# Patient Record
Sex: FEMALE | Race: BLACK OR AFRICAN AMERICAN | NOT HISPANIC OR LATINO | URBAN - METROPOLITAN AREA
[De-identification: names, ages, dates, MRNs, and addresses within clinical notes are randomized per-mention and may not be internally consistent; named-entity substitution may affect disease eponyms.]

---

## 2024-05-20 ENCOUNTER — OFFICE VISIT (OUTPATIENT)
Dept: RADIATION ONCOLOGY | Facility: CLINIC | Age: 84
End: 2024-05-20
Payer: COMMERCIAL

## 2024-05-20 VITALS
RESPIRATION RATE: 18 BRPM | BODY MASS INDEX: 29.17 KG/M2 | WEIGHT: 175.06 LBS | HEIGHT: 65 IN | HEART RATE: 71 BPM | SYSTOLIC BLOOD PRESSURE: 147 MMHG | TEMPERATURE: 97 F | OXYGEN SATURATION: 96 % | DIASTOLIC BLOOD PRESSURE: 68 MMHG

## 2024-05-20 DIAGNOSIS — C77.9 PRIMARY CANCER OF LEFT BREAST WITH STAGE 2 NODAL METASTASIS PER AMERICAN JOINT COMMITTEE ON CANCER 7TH EDITION (N2): Primary | ICD-10-CM

## 2024-05-20 DIAGNOSIS — C50.912 PRIMARY CANCER OF LEFT BREAST WITH STAGE 2 NODAL METASTASIS PER AMERICAN JOINT COMMITTEE ON CANCER 7TH EDITION (N2): Primary | ICD-10-CM

## 2024-05-20 PROCEDURE — 3077F SYST BP >= 140 MM HG: CPT | Mod: CPTII,S$GLB,, | Performed by: SPECIALIST

## 2024-05-20 PROCEDURE — 3288F FALL RISK ASSESSMENT DOCD: CPT | Mod: CPTII,S$GLB,, | Performed by: SPECIALIST

## 2024-05-20 PROCEDURE — 1101F PT FALLS ASSESS-DOCD LE1/YR: CPT | Mod: CPTII,S$GLB,, | Performed by: SPECIALIST

## 2024-05-20 PROCEDURE — 1159F MED LIST DOCD IN RCRD: CPT | Mod: CPTII,S$GLB,, | Performed by: SPECIALIST

## 2024-05-20 PROCEDURE — 1126F AMNT PAIN NOTED NONE PRSNT: CPT | Mod: CPTII,S$GLB,, | Performed by: SPECIALIST

## 2024-05-20 PROCEDURE — 99213 OFFICE O/P EST LOW 20 MIN: CPT | Mod: S$GLB,,, | Performed by: SPECIALIST

## 2024-05-20 PROCEDURE — 3078F DIAST BP <80 MM HG: CPT | Mod: CPTII,S$GLB,, | Performed by: SPECIALIST

## 2024-05-20 PROCEDURE — 99999 PR PBB SHADOW E&M-NEW PATIENT-LVL V: CPT | Mod: PBBFAC,,, | Performed by: SPECIALIST

## 2024-05-20 RX ORDER — LATANOPROST 50 UG/ML
1 SOLUTION/ DROPS OPHTHALMIC NIGHTLY
COMMUNITY
Start: 2024-04-23

## 2024-05-20 RX ORDER — NAPROXEN SODIUM 220 MG/1
81 TABLET, FILM COATED ORAL
COMMUNITY

## 2024-05-20 RX ORDER — BRIMONIDINE TARTRATE 2 MG/ML
1 SOLUTION/ DROPS OPHTHALMIC 2 TIMES DAILY
COMMUNITY
Start: 2024-04-23

## 2024-05-20 RX ORDER — TIMOLOL MALEATE 5 MG/ML
1 SOLUTION/ DROPS OPHTHALMIC 2 TIMES DAILY
COMMUNITY

## 2024-05-20 RX ORDER — ACETAZOLAMIDE 250 MG/1
500 TABLET ORAL 2 TIMES DAILY
COMMUNITY
Start: 2024-04-30

## 2024-05-20 RX ORDER — DEXTROMETHORPHAN HYDROBROMIDE, GUAIFENESIN 5; 100 MG/5ML; MG/5ML
650 LIQUID ORAL EVERY 8 HOURS PRN
COMMUNITY

## 2024-05-20 RX ORDER — MIRABEGRON 25 MG/1
25 TABLET, FILM COATED, EXTENDED RELEASE ORAL
COMMUNITY

## 2024-05-20 RX ORDER — MIRTAZAPINE 7.5 MG/1
1 TABLET, FILM COATED ORAL NIGHTLY
COMMUNITY
Start: 2024-05-01

## 2024-05-20 RX ORDER — AMOXICILLIN AND CLAVULANATE POTASSIUM 875; 125 MG/1; MG/1
1 TABLET, FILM COATED ORAL
COMMUNITY

## 2024-05-20 RX ORDER — MULTIVIT-MIN/IRON/FOLIC ACID/K 18-600-40
1 CAPSULE ORAL
COMMUNITY

## 2024-05-20 RX ORDER — NITROFURANTOIN 25; 75 MG/1; MG/1
CAPSULE ORAL
COMMUNITY
Start: 2023-09-07

## 2024-05-20 RX ORDER — DILTIAZEM HYDROCHLORIDE 240 MG/1
240 CAPSULE, COATED, EXTENDED RELEASE ORAL 2 TIMES DAILY
COMMUNITY
Start: 2024-03-17

## 2024-05-20 RX ORDER — OMEPRAZOLE 20 MG/1
20 CAPSULE, DELAYED RELEASE ORAL 2 TIMES DAILY PRN
COMMUNITY

## 2024-05-20 RX ORDER — ATORVASTATIN CALCIUM 20 MG/1
20 TABLET, FILM COATED ORAL
COMMUNITY

## 2024-05-20 RX ORDER — CLORAZEPATE DIPOTASSIUM 3.75 MG/1
3.75 TABLET ORAL
COMMUNITY
Start: 2024-02-08

## 2024-05-20 RX ORDER — BRINZOLAMIDE 10 MG/ML
SUSPENSION/ DROPS OPHTHALMIC
COMMUNITY
Start: 2024-05-16

## 2024-05-20 RX ORDER — CETIRIZINE HYDROCHLORIDE 10 MG/1
TABLET ORAL
COMMUNITY
Start: 2023-09-07

## 2024-05-20 RX ORDER — ASCORBIC ACID 125 MG
1 TABLET,CHEWABLE ORAL
COMMUNITY

## 2024-05-20 RX ORDER — PNV NO.95/FERROUS FUM/FOLIC AC 28MG-0.8MG
100 TABLET ORAL
COMMUNITY

## 2024-05-20 NOTE — PROGRESS NOTES
Mrs. Perkins returns for follow-up after undergoing neoadjuvant chemotherapy, bilateral mastectomy and left chest wall comprehensive radiotherapy.  She has done well in the interval since last seen although she continues to have significant lymphedema of her left arm.  She does have a pump at home but has not been using it.  She continues follow-up with Dr. Murphy.  Physical examination:  She has no evidence of disease recurrence on her chest wall.  She has no palpable axillary or supraclavicular adenopathy.  She has fairly good range of motion of her left shoulder.  She has significant lymphedema of her left arm.  Impression: We have had a fairly extensive discussion regarding use of her home lymphedema pump which she has not been using.  I have recommended that she use it twice daily for a week and then try daily and so on until she finds the right interval for her own comfort.  Plan: I will see her back in 6 months.  I certainly appreciate participating in this delightful woman's care.

## 2025-02-12 ENCOUNTER — TELEPHONE (OUTPATIENT)
Dept: HEMATOLOGY/ONCOLOGY | Facility: CLINIC | Age: 85
End: 2025-02-12
Payer: COMMERCIAL

## 2025-02-12 NOTE — TELEPHONE ENCOUNTER
KARLOS consulted to assist with transportation needs. KARLOS spoke with pt, and pt requests to be p/u from dt's home at [address] [id number]. KARLOS explained Lyft ride process to pt. SW added son Aj Perkins to Lyft ride notification, his # is [phone].  Pt also asked that SW add son and sister Catalina Alva [phone] to emergency contacts. Pt will need to call KARLOS dept at [phone] or [phone] for return ride home or Rad staff can enter as well. No other needs expressed. SW will remain available.     scheduled on Feb 19, 09:30 AM CST   will arrive around 09:30 AM CST.  Edit ride  Schedule return  Cancel ride  Nikolay  Full name  ANGELA PERKINS  Mobile phone number  [phone]  Ride type  Ly[employer] Standard (4 seats)  Additional mobile number  [phone]  Route    Pickup  [address], [place], [facility]  Drop-off  [facility]  Addresses may vary from the original request due to minor pickup and drop-off changes. For example, an address may vary if a rider is picked up across the street.      Note to   No note added  Tip added  $0.00  Request details    Coordinator name  Grace Saldivar  Date and time  2/12/25, 09:58 AM CST  Scheduled ride ID  [id number]  Internal note  No note added

## 2025-02-14 ENCOUNTER — TELEPHONE (OUTPATIENT)
Dept: RADIATION ONCOLOGY | Facility: CLINIC | Age: 85
End: 2025-02-14
Payer: COMMERCIAL

## 2025-02-14 NOTE — TELEPHONE ENCOUNTER
Attempted to call patient to reschedule Dr Ace's f/u appt on 2/19/25. There was no answer & unable to leave a message.

## 2025-02-26 ENCOUNTER — TELEPHONE (OUTPATIENT)
Dept: HEMATOLOGY/ONCOLOGY | Facility: CLINIC | Age: 85
End: 2025-02-26
Payer: COMMERCIAL

## 2025-02-26 NOTE — TELEPHONE ENCOUNTER
SW attempted to reach pt re: transportation request for rescheduled appt with Dr. Ace on 4/3/25. No answer or vm option. SW will attempt to reach pt again next week.

## 2025-03-05 ENCOUNTER — TELEPHONE (OUTPATIENT)
Dept: HEMATOLOGY/ONCOLOGY | Facility: CLINIC | Age: 85
End: 2025-03-05
Payer: COMMERCIAL

## 2025-03-10 ENCOUNTER — TELEPHONE (OUTPATIENT)
Dept: HEMATOLOGY/ONCOLOGY | Facility: CLINIC | Age: 85
End: 2025-03-10
Payer: COMMERCIAL

## 2025-03-10 NOTE — TELEPHONE ENCOUNTER
SW attempted to reach pt re: transportation request for rescheduled appt with Dr. Ace on 4/3/25. No answer or vm option. SW will remain available.

## 2025-04-01 ENCOUNTER — TELEPHONE (OUTPATIENT)
Dept: HEMATOLOGY/ONCOLOGY | Facility: CLINIC | Age: 85
End: 2025-04-01
Payer: COMMERCIAL

## 2025-04-01 NOTE — TELEPHONE ENCOUNTER
SW scheduled pt's transportation for radiation appt for 4/ with 8am pickup. S explained [employer] process and provided pt with SW contact number. Pt expressed understanding. SW will remain available.

## 2025-04-03 ENCOUNTER — OFFICE VISIT (OUTPATIENT)
Dept: RADIATION ONCOLOGY | Facility: CLINIC | Age: 85
End: 2025-04-03
Payer: COMMERCIAL

## 2025-04-03 VITALS
RESPIRATION RATE: 18 BRPM | HEIGHT: 65 IN | SYSTOLIC BLOOD PRESSURE: 127 MMHG | DIASTOLIC BLOOD PRESSURE: 63 MMHG | BODY MASS INDEX: 26.89 KG/M2 | WEIGHT: 161.38 LBS | HEART RATE: 52 BPM | OXYGEN SATURATION: 98 % | TEMPERATURE: 99 F

## 2025-04-03 DIAGNOSIS — C50.912 PRIMARY CANCER OF LEFT BREAST WITH STAGE 2 NODAL METASTASIS PER AMERICAN JOINT COMMITTEE ON CANCER 7TH EDITION (N2): Primary | ICD-10-CM

## 2025-04-03 DIAGNOSIS — C77.9 PRIMARY CANCER OF LEFT BREAST WITH STAGE 2 NODAL METASTASIS PER AMERICAN JOINT COMMITTEE ON CANCER 7TH EDITION (N2): Primary | ICD-10-CM

## 2025-04-03 PROCEDURE — 99999 PR PBB SHADOW E&M-EST. PATIENT-LVL IV: CPT | Mod: PBBFAC,,, | Performed by: SPECIALIST

## 2025-04-03 NOTE — PROGRESS NOTES
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  She had left-sided stage IIIC (T1c N2a M0) grade 3 triple negative breast cancer.  After AC x4 and Taxol x4 which was completed 10/15/2019, she underwent bilateral mastectomy with axillary node dissection.  She had 3 of 6 left axillary lymph nodes involved with the largest focus being 1.5 cm with extranodal extension.  There was also residual disease left in the breast.  I therefore treated her with comprehensive chest wall and regional leonard radiotherapy delivering 5000 cGy in 25 fractions over 37 days with carefully match photon beams including deep tangents to treat the internal mammary lymph nodes.  She completed therapy 03/18/2020.  She has had continued difficulty with lymphedema of the arm and has a pump at home but it isn't clear how much she uses it.  She is currently spending most of her time at her daughter's house.  She recently had CT scanning of the chest abdomen and pelvis which was without evidence of recurrent or metastatic disease.  Physical exam:  She has a pulse rate of 52 (reviewing her records reveal she is on Cardizem  twice daily with pulse rates ranging from the mid 50s up to 80 at other office visits.  She is not symptomatic from this but there is a recent history of a fall. ).  On examination of her chest wall she has post radiotherapy changes without palpable or visible evidence of disease.  She has no axillary adenopathy.  She has some limitation of range of motion of the shoulder.  She has significant lymphedema of the left arm and is wearing her sleeve and glove.  Impression: She continues to do well at the current time.  As she is now 5 years out I have offered to see her on an as-needed basis but she prefers to come in and see me regularly.  Plan: I will see her back in 6 months at her request.  I certainly appreciate participating  in this delightful woman's care.